# Patient Record
Sex: FEMALE | Race: WHITE | URBAN - METROPOLITAN AREA
[De-identification: names, ages, dates, MRNs, and addresses within clinical notes are randomized per-mention and may not be internally consistent; named-entity substitution may affect disease eponyms.]

---

## 2018-08-23 ENCOUNTER — TELEPHONE (OUTPATIENT)
Dept: CARDIOLOGY | Facility: CLINIC | Age: 63
End: 2018-08-23

## 2018-08-23 NOTE — TELEPHONE ENCOUNTER
Spoke with Juani today regarding the sudden death of her son almost 3 years ago.  ME report raised possibility of LQTS. Juani has been involved with [employer] organization.  Juani has been in touch with Dr. Brice's office.  They reviewed Juani and her 's medical records and raised the possibility of LQTS in her .    Discussed option of coming in for genetic counseling to review LQTS, inheritance, testing option, and review of family history.      Juani will look at her calendar and call [facility] to set up an appt.    She has a lot of questions, including when hypothermia can be used with a [facility] case.    Shama Mustafa, MS  Licensed Genetic Counselor  [facility]